# Patient Record
(demographics unavailable — no encounter records)

---

## 2024-11-06 NOTE — ASSESSMENT
[Diabetes Foot Care] : diabetes foot care [Long Term Vascular Complications] : long term vascular complications of diabetes [Carbohydrate Consistent Diet] : carbohydrate consistent diet [Importance of Diet and Exercise] : importance of diet and exercise to improve glycemic control, achieve weight loss and improve cardiovascular health [Exercise/Effect on Glucose] : exercise/effect on glucose [Hypoglycemia Management] : hypoglycemia management [Glucagon Use] : glucagon use [Ketone Testing] : ketone testing [Action and use of Insulin] : action and use of short and long-acting insulin [Self Monitoring of Blood Glucose] : self monitoring of blood glucose [Insulin Self-Administration] : insulin self-administration [Injection Technique, Storage, Sharps Disposal] : injection technique, storage, and sharps disposal [Sick-Day Management] : sick-day management [Retinopathy Screening] : Patient was referred to ophthalmology for retinopathy screening [Diabetic Medications] : Risks and benefits of diabetic medications were discussed [FreeTextEntry1] : 1. T1DM, uncontrolled - very poor compliance - advised on dm complications Basal : 12 am: 1.3, 5 am- 1.4, 5 am- 1.35, 10 am- 1.25, 12 pm- 1.25, 3pm 1.6, 9 pm- 1.7 I:C- 12 am- 1:7,  3 am -1:6, 5 am- 1:5,12 pm- 1:4, 2 pm- 1:5, 5 pm- 1:4 ISF- 12 am- 35, 10 am- 20,  9 pm- 35 - will see CDE this week for iLet training - we again discussed an off label use of GLP1RA. R+B reviewed in details. Ozempic 1 mg qw and uptitrate as directed. Advised on potential lower insulin requirements while on this medication . Will consider switching to Mounjaro - advised on iLet - labs today - hypo's precautions.  Baqsimi rx  2. Thyroid nodule -  repeat  thyroid US in 10/25 and reassess the need in [facility] RTC 2 mos ; to see CDE interim

## 2024-11-06 NOTE — HISTORY OF PRESENT ILLNESS
[FreeTextEntry1] : F/u for diabetes management  *** Nov 06, 2024 ***  once again missed his appts received iLet, still on Ozempic 1 mg qw  Basal : 12 am: 1.25, 5 am- 1.35, 9 am- 1.35, 12 pm- 1.15, 3pm 1.6, 9 pm- 1.6 I:C- 12 am- 1:7,  3 am -1:6, 5 am- 1:5,12 pm- 1:4, 2 pm- 1:5, 5 pm- 1:4 ISF- 12 am- 35, 10 am- 20,  9 pm- 35  ----------------------------- Dexcom Clarity ----------------------------- Garcia Bethany YOB: 1988 Generated at: Wed, Nov 6, 2024 12:52 PM EST Reporting period: Thu Oct 24, 2024 - Wed Nov 6, 2024 ----------------------------- Glucose Details Average glucose: 259 mg/dL Standard deviation: 91 mg/dL ----------------------------- Time in Range Very High: 52% High: 25% In Range: 23% Low: 0% Very Low: <1% Target Range  mg/dL  ----------------------------- CGM Details Sensor usage: 93% Days with CGM data: 13/14  Thyroid US(10/03/24)- RMP 0.4x0.3x0.3 cyst, LMP 0.9x0.5x0.9 hypo (TR4)  *** Mar 12, 2024 ***  missed f/u visits  on Tandem Tslim, Dexcom G6, Ozempic 0.5 mg  Basal : 12 am: 1.25, 5 am- 1.3, 10 am- 1.25, 3pm 1.5, 9 pm- 1.45 I:C- 12 am- 1:6,  5 am- 1:5,9 pm- 1:6 ISF- 12 am- 35, 10 am- 20,  9 pm- 35  Date of download:  03/12/2024  Diabetes Medications and Dosage: as above Indication for CGMS: verify a change in the treatment regimen, identify periods of hypoglycemia/ hyperglycemia.  Modal day report: pattern.  Pt with HYPO  <1% of the time ( <1% below 54),  63% in target range Hyperglycemia:  36% elevation  Identified issues: carbohydrate counting dates analyzed:  02/28/2024- 03/12/2024  ***JUNE 08, 2023***   T1DM on humalog via Tandem 2 pump with Dexcom.  He reports feeling well overall reports intermittent headaches, cravings and endorses eating larger portions lately.  gained weight. tried ozempic with a great response, but is no longer covered FS in office is 108 mg/dL 6.5 hours after eating.   He denies recent subjective lows.   Mr Anderson denies changing tandem settings since last visit in November 2022:   Basal : 12 am: 1.25, 5 am- 1.4, 10 am- 1.3, 3 pm- 1.4, 6 pm -1.5 I:C- 12 am- 1:6, 5 am- 1:5, 10 am- 1:4, 9 pm- 1:5 ISF- 12 am- 35, 10 am- 20, 3 pm- 30, 6 pm- 20, 9 pm- 35  DIet:  works 24hrs, diet depends on whatever is being made in [place]. Breakfast 8-9a: eggs, half bagel, cereal, coffee with vanilla creamer, Lunch 1-2p: rice chicken, burger fries, varies. Dinner 8-9p tries to stick to protein avoids starch, carbs at night exercise can be 8-9am or 12-1p Date of download:  06/08/2023 Diabetes Medications and Dosage: as above Indication for CGMS: verify a change in the treatment regimen, identify periods of hypoglycemia/ hyperglycemia. Modal day report: pattern. Pt with HYPO  2% of the time ( NONE below 54),  40% in target range Hyperglycemia:  58% elevation Identified issues: diet, irregular schedule, carbohydrate counting dates analyzed:  05/26/2023 - 06/08/2023  *** Nov 15, 2022 ***  last visit almost 2 years ago had an appendicitis last month, s/p lap appendectomy on 10/6/22 gained more than 50 lbs x past 3 years. with food craving staying on Dexcom G6, Tandem X2 Basal : 12 am: 1.25, 5 am- 1.4, 10 am- 1.3, 3 pm- 1.4, 6 pm -1.5 I:C- 12 am- 1:6, 5 am- 1:5, 10 am- 1:4, 9 pm- 1:5 ISF- 12 am- 35, 10 am- 20, 3 pm- 30, 6 pm- 20, 9 pm- 35 . Date of download:  11/15/2022  Diabetes Medications and Dosage: as above Indication for CGMS: verify a change in the treatment regimen, identify periods of hypoglycemia/ hyperglycemia.  Modal day report: pattern.  Pt with HYPO  <1% of the time ( <1% below 54),  49% in target range Hyperglycemia:  50% elevation  Identified issues: carbohydrate counting dates analyzed: 11/02/2022 - 11/15/2022    *** Dec 29, 2020 ***  on tandem X2 + Dexcom G6 has no problems using the pump with his working gear  Basal: 12 am- 1.0 un/hr ISF- 35 I:C- 1: 9  Date of download:  12/29/20 Diabetes Medications and Dosage: as above Indication for CGMS: verify a change in the treatment regimen, identify periods of hypoglycemia/ hyperglycemia.  Modal day report: pattern.  Pt with HYPO  0% of the time (NONE below 54),  44% in target range Hyperglycemia:  56% elevation  Identified issues: carbohydrate counting dates analyzed: 12/16/20-12/29/20  *** Sep 23, 2020 ***  feels better. did not do thyroid US yet using Dexcom G6 application for Tandem pump is still in process On Basaglar 30 un, NISS 10 to 15 un ac meals. has  a midnight snack- covers with 5 un  of Novolog uses I:C= 1: 3 to  1:4 and CF= 1:10 uses target 180-200  Date of download: 9/23/20   Diabetes Medications and Dosage: as above Indication for CGMS: verify a change in the treatment regimen, identify periods of hypoglycemia/ hyperglycemia.  Modal day report: pattern.  Pt with HYPO  1% of the time (NONE below 54),  55% in target range Hyperglycemia:  43% elevation  Identified issues: carbohydrate counting, nighttime highs, typically 6 pm- 3 am dates analyzed: 9/10/20-9/23/20    *** Jul 22, 2020 ***  missed multiple appts no recent labs using Dexcom but is not sharing data yet. did not proceed with pump eval yet Basaglar 25 un HS, stopped using NISS and is mostly taking 10 un BID only  Thyr US (10/22/19)- LMP hypo 0.6x0.5x0.6 (TR4)  *** Oct 17, 2019 ***  on Basaglar 24 un HS, novolog tid ac (0-6-4-7-8-9-10-11) tests 5x day  no log, reports fbs- 140-230, ac L- low 200's, ac D- mid 200's- upto 360, HS- mid 200's reports several hypo's random at night - mid 50's did not get Dexcom yet  *** July 15, 2019 ***  Patient returned for julissa interpretation and diabetes management see detailed CDE note  ICA- neg, GAD65 ab- 89 (nl < 5) on basaglar 30 un HS, admelog 5 un tid ac meals. Still on OHG  HISTORY OF PRESENT ILLNESS.   Mr. ANDERSON was diagnosed with Diabetes Mellitus Type 2 in 2017 He denies CAD, dyslipidemia, HTN, known complications of retinopathy, nephropathy, or neuropathy.  Presently on metformin 1000mg bid, januvia 100mg qd, Basaglar 30 units HS, lisinopril 2.5mg Blood sugars are checked  once a day.  Did not bring log book, but reported are typically as following: FBS- under 130. Not checking PPG Hypoglycemia frequency: none Fingerstick glucose in the office today is 149 mg/dL 5 hours after eating.  Diet: not following ADA Exercise: jogging. He is a , and his job is very physically demanding  Lab review (6/27/19) : a1c- 11.4, creatinine- 0.81, calcium- 9.9, LFT's- normal, TSH-  0.8, urine microalbumin- 30  ***  Last dilated eye - 01/24 Last podiatry visit  - 11/22 Last cardiology evaluation - none Last stress test - none Last 2-D Echo - none Last nephrology evaluation - none Last neurology evaluation- none

## 2025-02-24 NOTE — ASSESSMENT
[Diabetes Foot Care] : diabetes foot care [Long Term Vascular Complications] : long term vascular complications of diabetes [Carbohydrate Consistent Diet] : carbohydrate consistent diet [Importance of Diet and Exercise] : importance of diet and exercise to improve glycemic control, achieve weight loss and improve cardiovascular health [Exercise/Effect on Glucose] : exercise/effect on glucose [Hypoglycemia Management] : hypoglycemia management [Glucagon Use] : glucagon use [Ketone Testing] : ketone testing [Action and use of Insulin] : action and use of short and long-acting insulin [Self Monitoring of Blood Glucose] : self monitoring of blood glucose [Insulin Self-Administration] : insulin self-administration [Injection Technique, Storage, Sharps Disposal] : injection technique, storage, and sharps disposal [Sick-Day Management] : sick-day management [Retinopathy Screening] : Patient was referred to ophthalmology for retinopathy screening [Diabetic Medications] : Risks and benefits of diabetic medications were discussed [FreeTextEntry1] : 1. T1DM, uncontrolled - very poor compliance - advised on dm complications - cont iLet pump-  he'll see our CDE this week for pump re-education. Might need to "reset and retrain the pump" - will see CDE this week for iLet training - we again discussed an off label use of GLP1RA. R+B reviewed in details. D/c Ozempic and start Mounjaro 2.5 mg qw (R+B) and uptitrate as directed. Advised on potential lower insulin requirements while on this medication .  - labs today - hypo's precautions.  Baqsimi rx - obtain most recent labs from PCP  2. Thyroid nodule -  repeat  thyroid US in 10/25 and reassess the need in [facility] RTC 3 mos ; to see CDE interim

## 2025-02-24 NOTE — HISTORY OF PRESENT ILLNESS
[FreeTextEntry1] : F/u for diabetes management  *** Feb 24, 2025 ***  started on iLet- likes the pump.  remains on Ozempic 1 mg qw, but gained a lot of weight past week had a problem with his site insertion labs done within a month Date of download:  02/24/2025  Diabetes Medications and Dosage: as above Indication for CGMS: verify a change in the treatment regimen, identify periods of hypoglycemia/ hyperglycemia.  Modal day report: pattern.  Pt with HYPO  1% of the time ( <1%  below 54),  41% in target range Hyperglycemia:  58% elevation  Identified issues: carbohydrate counting. insertion place troubles. multiple boluses back to back  and missing some boluses dates analyzed:  02/11/2025- 02/24/2025  POC a1c- 7.9%   *** Nov 06, 2024 ***  once again missed his appts received iLet, still on Ozempic 1 mg qw  Basal : 12 am: 1.25, 5 am- 1.35, 9 am- 1.35, 12 pm- 1.15, 3pm 1.6, 9 pm- 1.6 I:C- 12 am- 1:7,  3 am -1:6, 5 am- 1:5,12 pm- 1:4, 2 pm- 1:5, 5 pm- 1:4 ISF- 12 am- 35, 10 am- 20,  9 pm- 35  ----------------------------- Dexcom Clarity ----------------------------- Garcia Anderson YOB: 1988 Generated at: Wed, Nov 6, 2024 12:52 PM EST Reporting period: Thu Oct 24, 2024 - Wed Nov 6, 2024 ----------------------------- Glucose Details Average glucose: 259 mg/dL Standard deviation: 91 mg/dL ----------------------------- Time in Range Very High: 52% High: 25% In Range: 23% Low: 0% Very Low: <1% Target Range  mg/dL  ----------------------------- CGM Details Sensor usage: 93% Days with CGM data: 13/14  Thyroid US(10/03/24)- RMP 0.4x0.3x0.3 cyst, LMP 0.9x0.5x0.9 hypo (TR4)  *** Mar 12, 2024 ***  missed f/u visits  on Tandem Tslim, Dexcom G6, Ozempic 0.5 mg  Basal : 12 am: 1.25, 5 am- 1.3, 10 am- 1.25, 3pm 1.5, 9 pm- 1.45 I:C- 12 am- 1:6,  5 am- 1:5,9 pm- 1:6 ISF- 12 am- 35, 10 am- 20,  9 pm- 35  Date of download:  03/12/2024  Diabetes Medications and Dosage: as above Indication for CGMS: verify a change in the treatment regimen, identify periods of hypoglycemia/ hyperglycemia.  Modal day report: pattern.  Pt with HYPO  <1% of the time ( <1% below 54),  63% in target range Hyperglycemia:  36% elevation  Identified issues: carbohydrate counting dates analyzed:  02/28/2024- 03/12/2024  ***JUNE 08, 2023***   T1DM on humalog via Tandem 2 pump with Dexcom.  He reports feeling well overall reports intermittent headaches, cravings and endorses eating larger portions lately.  gained weight. tried ozempic with a great response, but is no longer covered FS in office is 108 mg/dL 6.5 hours after eating.   He denies recent subjective lows.   Mr Anderson denies changing tandem settings since last visit in November 2022:   Basal : 12 am: 1.25, 5 am- 1.4, 10 am- 1.3, 3 pm- 1.4, 6 pm -1.5 I:C- 12 am- 1:6, 5 am- 1:5, 10 am- 1:4, 9 pm- 1:5 ISF- 12 am- 35, 10 am- 20, 3 pm- 30, 6 pm- 20, 9 pm- 35  DIet:  works 24hrs, diet depends on whatever is being made in [place] [employer]. Breakfast 8-9a: eggs, half bagel, cereal, coffee with vanilla creamer, Lunch 1-2p: rice chicken, burger fries, varies. Dinner 8-9p tries to stick to protein avoids starch, carbs at night exercise can be 8-9am or 12-1p Date of download:  06/08/2023 Diabetes Medications and Dosage: as above Indication for CGMS: verify a change in the treatment regimen, identify periods of hypoglycemia/ hyperglycemia. Modal day report: pattern. Pt with HYPO  2% of the time ( NONE below 54),  40% in target range Hyperglycemia:  58% elevation Identified issues: diet, irregular schedule, carbohydrate counting dates analyzed:  05/26/2023 - 06/08/2023  *** Nov 15, 2022 ***  last visit almost 2 years ago had an appendicitis last month, s/p lap appendectomy on 10/6/22 gained more than 50 lbs x past 3 years. with food craving staying on Dexcom G6, Tandem X2 Basal : 12 am: 1.25, 5 am- 1.4, 10 am- 1.3, 3 pm- 1.4, 6 pm -1.5 I:C- 12 am- 1:6, 5 am- 1:5, 10 am- 1:4, 9 pm- 1:5 ISF- 12 am- 35, 10 am- 20, 3 pm- 30, 6 pm- 20, 9 pm- 35 . Date of download:  11/15/2022  Diabetes Medications and Dosage: as above Indication for CGMS: verify a change in the treatment regimen, identify periods of hypoglycemia/ hyperglycemia.  Modal day report: pattern.  Pt with HYPO  <1% of the time ( <1% below 54),  49% in target range Hyperglycemia:  50% elevation  Identified issues: carbohydrate counting dates analyzed: 11/02/2022 - 11/15/2022    *** Dec 29, 2020 ***  on tandem X2 + Dexcom G6 has no problems using the pump with his working gear  Basal: 12 am- 1.0 un/hr ISF- 35 I:C- 1: 9  Date of download:  12/29/20 Diabetes Medications and Dosage: as above Indication for CGMS: verify a change in the treatment regimen, identify periods of hypoglycemia/ hyperglycemia.  Modal day report: pattern.  Pt with HYPO  0% of the time (NONE below 54),  44% in target range Hyperglycemia:  56% elevation  Identified issues: carbohydrate counting dates analyzed: 12/16/20-12/29/20  *** Sep 23, 2020 ***  feels better. did not do thyroid US yet using Dexcom G6 application for Tandem pump is still in process On Basaglar 30 un, NISS 10 to 15 un ac meals. has  a midnight snack- covers with 5 un  of Novolog uses I:C= 1: 3 to  1:4 and CF= 1:10 uses target 180-200  Date of download: 9/23/20   Diabetes Medications and Dosage: as above Indication for CGMS: verify a change in the treatment regimen, identify periods of hypoglycemia/ hyperglycemia.  Modal day report: pattern.  Pt with HYPO  1% of the time (NONE below 54),  55% in target range Hyperglycemia:  43% elevation  Identified issues: carbohydrate counting, nighttime highs, typically 6 pm- 3 am dates analyzed: 9/10/20-9/23/20    *** Jul 22, 2020 ***  missed multiple appts no recent labs using Dexcom but is not sharing data yet. did not proceed with pump eval yet Basaglar 25 un HS, stopped using NISS and is mostly taking 10 un BID only  Thyr US (10/22/19)- LMP hypo 0.6x0.5x0.6 (TR4)  *** Oct 17, 2019 ***  on Basaglar 24 un HS, novolog tid ac (2-0-0-7-8-9-10-11) tests 5x day  no log, reports fbs- 140-230, ac L- low 200's, ac D- mid 200's- upto 360, HS- mid 200's reports several hypo's random at night - mid 50's did not get Dexcom yet  *** July 15, 2019 ***  Patient returned for julissa interpretation and diabetes management see detailed CDE note  ICA- neg, GAD65 ab- 89 (nl < 5) on basaglar 30 un HS, admelog 5 un tid ac meals. Still on OHG  HISTORY OF PRESENT ILLNESS.   Mr. ANDERSON was diagnosed with Diabetes Mellitus Type 2 in 2017 He denies CAD, dyslipidemia, HTN, known complications of retinopathy, nephropathy, or neuropathy.  Presently on metformin 1000mg bid, januvia 100mg qd, Basaglar 30 units HS, lisinopril 2.5mg Blood sugars are checked  once a day.  Did not bring log book, but reported are typically as following: FBS- under 130. Not checking PPG Hypoglycemia frequency: none Fingerstick glucose in the office today is 149 mg/dL 5 hours after eating.  Diet: not following ADA Exercise: jogging. He is a , and his job is very physically demanding  Lab review (6/27/19) : a1c- 11.4, creatinine- 0.81, calcium- 9.9, LFT's- normal, TSH-  0.8, urine microalbumin- 30  ***  Last dilated eye - 01/24 Last podiatry visit  - 11/22 Last cardiology evaluation - none Last stress test - none Last 2-D Echo - none Last nephrology evaluation - none Last neurology evaluation- none